# Patient Record
Sex: FEMALE | Race: WHITE | Employment: OTHER | URBAN - METROPOLITAN AREA
[De-identification: names, ages, dates, MRNs, and addresses within clinical notes are randomized per-mention and may not be internally consistent; named-entity substitution may affect disease eponyms.]

---

## 2024-05-03 ENCOUNTER — HOSPITAL ENCOUNTER (EMERGENCY)
Age: 69
Discharge: HOME | End: 2024-05-03
Attending: EMERGENCY MEDICINE
Payer: MEDICARE

## 2024-05-03 ENCOUNTER — APPOINTMENT (OUTPATIENT)
Dept: GENERAL RADIOLOGY | Age: 69
End: 2024-05-03
Payer: MEDICARE

## 2024-05-03 VITALS
BODY MASS INDEX: 23.01 KG/M2 | SYSTOLIC BLOOD PRESSURE: 138 MMHG | RESPIRATION RATE: 17 BRPM | DIASTOLIC BLOOD PRESSURE: 76 MMHG | HEART RATE: 76 BPM | TEMPERATURE: 98.6 F | OXYGEN SATURATION: 100 % | WEIGHT: 146.61 LBS | HEIGHT: 67 IN

## 2024-05-03 DIAGNOSIS — S82.141A TIBIAL PLATEAU FRACTURE, RIGHT, CLOSED, INITIAL ENCOUNTER: Primary | ICD-10-CM

## 2024-05-03 PROCEDURE — 73562 X-RAY EXAM OF KNEE 3: CPT

## 2024-05-03 PROCEDURE — 99283 EMERGENCY DEPT VISIT LOW MDM: CPT

## 2024-05-03 RX ORDER — MELOXICAM 15 MG/1
15 TABLET ORAL DAILY
Qty: 30 TABLET | Refills: 0 | Status: SHIPPED | OUTPATIENT
Start: 2024-05-03

## 2024-05-03 ASSESSMENT — PAIN DESCRIPTION - FREQUENCY: FREQUENCY: CONTINUOUS

## 2024-05-03 ASSESSMENT — PAIN SCALES - GENERAL
PAINLEVEL_OUTOF10: 5
PAINLEVEL_OUTOF10: 4

## 2024-05-03 ASSESSMENT — LIFESTYLE VARIABLES: HOW OFTEN DO YOU HAVE A DRINK CONTAINING ALCOHOL: NEVER

## 2024-05-03 ASSESSMENT — PAIN - FUNCTIONAL ASSESSMENT
PAIN_FUNCTIONAL_ASSESSMENT: 0-10
PAIN_FUNCTIONAL_ASSESSMENT: 0-10

## 2024-05-03 ASSESSMENT — PAIN DESCRIPTION - LOCATION
LOCATION: KNEE
LOCATION: KNEE

## 2024-05-03 ASSESSMENT — PAIN DESCRIPTION - ORIENTATION
ORIENTATION: LEFT
ORIENTATION: RIGHT

## 2024-05-03 ASSESSMENT — PAIN DESCRIPTION - DESCRIPTORS: DESCRIPTORS: THROBBING

## 2024-05-03 ASSESSMENT — PAIN DESCRIPTION - PAIN TYPE: TYPE: ACUTE PAIN

## 2024-05-03 NOTE — ED TRIAGE NOTES
Pt sitting in bed with left knee elevated on pillow, ice pack applied. Pt states pain is better with her knee elevated and not moving. + edema to anterior knee, no redness, ecchymosis, warm to touch with brisk cap refill.

## 2024-05-03 NOTE — ED PROVIDER NOTES
[facility] Emergency Department      Pt Name: Jenn Moreno  MRN: [id number]  Birthdate 1955  Date of evaluation: 5/3/2024  Provider: JEFFREY MARINELLI MD  CHIEF COMPLAINT  Chief Complaint   Patient presents with    Knee Injury     About 2 hours prior to arrival she injured her left knee while rolling a large tractor tire that fell on top of her knee.  Very difficult ambulating due to the pain.  Is using a cane to assist with ambulation.  Has had prior meniscus surgery on this knee in 2017.     HPI  Jenn Moreno is a 69 y.o. female who presents because of left knee injury.  A large tractor tire fell on her knee and caused a twisting injury two hours PTA.  She says they were rolling it to move it and lost control of it.  It kind of pinned her knee initially.  She had prior meniscus tear to this knee in 2015.  Denies any other injury.    REVIEW OF SYSTEMS:  No other injury other than right forearm is a little sore, swelling Pertinent positives and negatives as per the HPI.  All other pertinent review of systems reviewed and negative.  Nursing notes reviewed.    PAST MEDICAL HISTORY  Past Medical History:   Diagnosis Date    MVP (mitral valve prolapse)      SURGICAL HISTORY  Past Surgical History:   Procedure Laterality Date    BREAST SURGERY      KNEE ARTHROSCOPY Left 11/6/2015    LEFT KNEE ARTHROSCOPE PARTIAL MEDICAL MENISCECTOMY AND    TONSILLECTOMY       MEDICATIONS:  No current facility-administered medications on file prior to encounter.     Current Outpatient Medications on File Prior to Encounter   Medication Sig Dispense Refill    meloxicam (MOBIC) 15 MG tablet TAKE 1 TABLET BY MOUTH ONCE DAILY 30 tablet 2     ALLERGIES  Norco [hydrocodone-acetaminophen]  SOCIAL HISTORY:  Social History     Tobacco Use    Smoking status: Never   Substance Use Topics    Alcohol use: Yes     Comment: social    Drug use: No     IMMUNIZATIONS:  Noncontributory    PHYSICAL EXAM  VITAL SIGNS:  Blood pressure 138/76, pulse 76,  MD KEIKO  [address]  [address]  [place] [facility] [place] [address]  [phone]    Schedule an appointment as soon as possible for a visit       [facility]  [address]  [facility] [address] [phone]  Go to   If symptoms worsen    FINAL IMPRESSION:    1. Tibial plateau fracture, right, closed, initial encounter      (Please note that I used voice recognition software to generate this note.  Occasionally words are mistranscribed despite my efforts to edit errors.)      Nikky Vance MD  05/03/24 2037